# Patient Record
Sex: FEMALE
[De-identification: names, ages, dates, MRNs, and addresses within clinical notes are randomized per-mention and may not be internally consistent; named-entity substitution may affect disease eponyms.]

---

## 2018-05-14 VITALS — DIASTOLIC BLOOD PRESSURE: 71 MMHG | SYSTOLIC BLOOD PRESSURE: 128 MMHG

## 2018-05-17 ENCOUNTER — HOSPITAL ENCOUNTER (OUTPATIENT)
Dept: HOSPITAL 90 - DAH | Age: 68
Discharge: HOME | End: 2018-05-17
Attending: NEUROLOGICAL SURGERY
Payer: MEDICARE

## 2018-05-17 VITALS — SYSTOLIC BLOOD PRESSURE: 153 MMHG | DIASTOLIC BLOOD PRESSURE: 72 MMHG

## 2018-05-17 VITALS — SYSTOLIC BLOOD PRESSURE: 133 MMHG | DIASTOLIC BLOOD PRESSURE: 69 MMHG

## 2018-05-17 VITALS — DIASTOLIC BLOOD PRESSURE: 63 MMHG | SYSTOLIC BLOOD PRESSURE: 128 MMHG

## 2018-05-17 VITALS — BODY MASS INDEX: 25.82 KG/M2 | HEIGHT: 58 IN | WEIGHT: 123 LBS

## 2018-05-17 VITALS — SYSTOLIC BLOOD PRESSURE: 116 MMHG | DIASTOLIC BLOOD PRESSURE: 65 MMHG

## 2018-05-17 VITALS — SYSTOLIC BLOOD PRESSURE: 119 MMHG | DIASTOLIC BLOOD PRESSURE: 64 MMHG

## 2018-05-17 VITALS — DIASTOLIC BLOOD PRESSURE: 66 MMHG | SYSTOLIC BLOOD PRESSURE: 125 MMHG

## 2018-05-17 VITALS — DIASTOLIC BLOOD PRESSURE: 65 MMHG | SYSTOLIC BLOOD PRESSURE: 128 MMHG

## 2018-05-17 VITALS — DIASTOLIC BLOOD PRESSURE: 61 MMHG | SYSTOLIC BLOOD PRESSURE: 118 MMHG

## 2018-05-17 VITALS — SYSTOLIC BLOOD PRESSURE: 125 MMHG | DIASTOLIC BLOOD PRESSURE: 69 MMHG

## 2018-05-17 VITALS — SYSTOLIC BLOOD PRESSURE: 129 MMHG | DIASTOLIC BLOOD PRESSURE: 64 MMHG

## 2018-05-17 VITALS — DIASTOLIC BLOOD PRESSURE: 59 MMHG | SYSTOLIC BLOOD PRESSURE: 122 MMHG

## 2018-05-17 VITALS — SYSTOLIC BLOOD PRESSURE: 123 MMHG | DIASTOLIC BLOOD PRESSURE: 62 MMHG

## 2018-05-17 VITALS — DIASTOLIC BLOOD PRESSURE: 64 MMHG | SYSTOLIC BLOOD PRESSURE: 117 MMHG

## 2018-05-17 DIAGNOSIS — I99.8: ICD-10-CM

## 2018-05-17 DIAGNOSIS — E11.9: ICD-10-CM

## 2018-05-17 DIAGNOSIS — G56.02: Primary | ICD-10-CM

## 2018-05-17 DIAGNOSIS — M19.90: ICD-10-CM

## 2018-05-17 DIAGNOSIS — Z79.84: ICD-10-CM

## 2018-05-17 DIAGNOSIS — Z79.899: ICD-10-CM

## 2018-05-17 DIAGNOSIS — I10: ICD-10-CM

## 2018-05-17 DIAGNOSIS — I48.91: ICD-10-CM

## 2018-05-17 PROCEDURE — 64721 CARPAL TUNNEL SURGERY: CPT

## 2018-05-17 PROCEDURE — 82948 REAGENT STRIP/BLOOD GLUCOSE: CPT

## 2018-05-17 PROCEDURE — 93005 ELECTROCARDIOGRAM TRACING: CPT

## 2018-05-17 RX ADMIN — SODIUM CHLORIDE SCH GM: 9 INJECTION, SOLUTION INTRAVENOUS at 07:30

## 2018-05-17 RX ADMIN — SODIUM CHLORIDE SCH GM: 9 INJECTION, SOLUTION INTRAVENOUS at 05:00

## 2020-12-03 ENCOUNTER — HOSPITAL ENCOUNTER (OUTPATIENT)
Dept: HOSPITAL 90 - RAH | Age: 70
Discharge: HOME | End: 2020-12-03
Attending: NEUROLOGICAL SURGERY
Payer: COMMERCIAL

## 2020-12-03 DIAGNOSIS — M47.812: Primary | ICD-10-CM

## 2020-12-03 DIAGNOSIS — M85.88: ICD-10-CM

## 2020-12-03 DIAGNOSIS — M43.13: ICD-10-CM

## 2020-12-03 DIAGNOSIS — M43.22: ICD-10-CM

## 2020-12-03 DIAGNOSIS — M48.02: ICD-10-CM

## 2020-12-03 LAB
BASOPHILS NFR BLD AUTO: 0.6 % (ref 0–5)
BUN SERPL-MCNC: 23 MG/DL (ref 7–18)
CHLORIDE SERPL-SCNC: 104 MMOL/L (ref 101–111)
CO2 SERPL-SCNC: 29 MMOL/L (ref 21–32)
CREAT SERPL-MCNC: 0.9 MG/DL (ref 0.5–1.5)
EOSINOPHIL NFR BLD AUTO: 1 % (ref 0–8)
ERYTHROCYTE [DISTWIDTH] IN BLOOD BY AUTOMATED COUNT: 13.3 % (ref 11–15.5)
GFR SERPL CREATININE-BSD FRML MDRD: 66 ML/MIN (ref 60–?)
GLUCOSE SERPL-MCNC: 72 MG/DL (ref 70–105)
HCT VFR BLD AUTO: 34 % (ref 36–48)
LYMPHOCYTES NFR SPEC AUTO: 29.8 % (ref 21–51)
MCH RBC QN AUTO: 27.6 PG (ref 27–33)
MCHC RBC AUTO-ENTMCNC: 31.5 G/DL (ref 32–36)
MCV RBC AUTO: 87.9 FL (ref 79–99)
MONOCYTES NFR BLD AUTO: 7.6 % (ref 3–13)
NEUTROPHILS NFR BLD AUTO: 60.8 % (ref 40–77)
NRBC BLD MANUAL-RTO: 0 % (ref 0–0.19)
PLATELET # BLD AUTO: 349 K/UL (ref 130–400)
POTASSIUM SERPL-SCNC: 4.3 MMOL/L (ref 3.5–5.1)
RBC # BLD AUTO: 3.87 MIL/UL (ref 4–5.5)
SODIUM SERPL-SCNC: 142 MMOL/L (ref 136–145)
WBC # BLD AUTO: 8.7 K/UL (ref 4.8–10.8)

## 2020-12-03 PROCEDURE — 72050 X-RAY EXAM NECK SPINE 4/5VWS: CPT

## 2020-12-08 VITALS — SYSTOLIC BLOOD PRESSURE: 117 MMHG | DIASTOLIC BLOOD PRESSURE: 60 MMHG

## 2020-12-08 LAB
DEPRECATED SQUAMOUS URNS QL MICRO: (no result) /HPF (ref 0–2)
PH UR STRIP: 5.5 [PH] (ref 5–8)
RBC #/AREA URNS HPF: (no result) /HPF (ref 0–1)
SP GR UR STRIP: 1.01 (ref 1–1.03)
UROBILINOGEN UR STRIP-MCNC: 0.2 MG/DL (ref 0.2–1)

## 2020-12-08 RX ADMIN — SODIUM CHLORIDE SCH GM: 9 INJECTION, SOLUTION INTRAVENOUS at 06:00

## 2020-12-08 NOTE — NUR
UA

PT STATES HAS A TEMP .3. DR. GARDINER INFORMED. UA AND PAID COVID ORDERED. PT HAS UTI AND 
NEGATIVE RAPID RESULT. INFORMED DR. ZULEYKA GATICA ASST. AS PER DR. GARDINER, HE WILL PROCEED WITH 
SURGERY AND CALL IN A PRESCRIPTION FOR HER UTI TO START TODAY. PT INFORMED. VERBALIZED 
UNDERSTANDING, AND WIAHES TO PROCEED WITH SURGERY TOMORROW.

## 2020-12-09 ENCOUNTER — HOSPITAL ENCOUNTER (OUTPATIENT)
Dept: HOSPITAL 90 - DAH | Age: 70
Discharge: HOME | End: 2020-12-09
Attending: NEUROLOGICAL SURGERY
Payer: COMMERCIAL

## 2020-12-09 VITALS — DIASTOLIC BLOOD PRESSURE: 58 MMHG | SYSTOLIC BLOOD PRESSURE: 132 MMHG

## 2020-12-09 VITALS — SYSTOLIC BLOOD PRESSURE: 112 MMHG | DIASTOLIC BLOOD PRESSURE: 47 MMHG

## 2020-12-09 VITALS — SYSTOLIC BLOOD PRESSURE: 136 MMHG | DIASTOLIC BLOOD PRESSURE: 54 MMHG

## 2020-12-09 VITALS — DIASTOLIC BLOOD PRESSURE: 54 MMHG | SYSTOLIC BLOOD PRESSURE: 113 MMHG

## 2020-12-09 VITALS — SYSTOLIC BLOOD PRESSURE: 116 MMHG | DIASTOLIC BLOOD PRESSURE: 48 MMHG

## 2020-12-09 VITALS — DIASTOLIC BLOOD PRESSURE: 56 MMHG | SYSTOLIC BLOOD PRESSURE: 118 MMHG

## 2020-12-09 VITALS — SYSTOLIC BLOOD PRESSURE: 117 MMHG | DIASTOLIC BLOOD PRESSURE: 51 MMHG

## 2020-12-09 VITALS — DIASTOLIC BLOOD PRESSURE: 51 MMHG | SYSTOLIC BLOOD PRESSURE: 107 MMHG

## 2020-12-09 VITALS — HEIGHT: 58 IN | BODY MASS INDEX: 25.82 KG/M2 | WEIGHT: 123 LBS

## 2020-12-09 VITALS — SYSTOLIC BLOOD PRESSURE: 112 MMHG | DIASTOLIC BLOOD PRESSURE: 50 MMHG

## 2020-12-09 VITALS — DIASTOLIC BLOOD PRESSURE: 60 MMHG | SYSTOLIC BLOOD PRESSURE: 126 MMHG

## 2020-12-09 VITALS — SYSTOLIC BLOOD PRESSURE: 124 MMHG | DIASTOLIC BLOOD PRESSURE: 66 MMHG

## 2020-12-09 DIAGNOSIS — Z98.1: ICD-10-CM

## 2020-12-09 DIAGNOSIS — Z91.040: ICD-10-CM

## 2020-12-09 DIAGNOSIS — G56.01: Primary | ICD-10-CM

## 2020-12-09 DIAGNOSIS — E11.9: ICD-10-CM

## 2020-12-09 DIAGNOSIS — Z79.82: ICD-10-CM

## 2020-12-09 DIAGNOSIS — Z79.899: ICD-10-CM

## 2020-12-09 DIAGNOSIS — I10: ICD-10-CM

## 2020-12-09 DIAGNOSIS — Z79.84: ICD-10-CM

## 2020-12-09 DIAGNOSIS — Z98.890: ICD-10-CM

## 2020-12-09 DIAGNOSIS — Z20.828: ICD-10-CM

## 2020-12-09 PROCEDURE — 93005 ELECTROCARDIOGRAM TRACING: CPT

## 2020-12-09 PROCEDURE — 87186 SC STD MICRODIL/AGAR DIL: CPT

## 2020-12-09 PROCEDURE — 81001 URINALYSIS AUTO W/SCOPE: CPT

## 2020-12-09 PROCEDURE — 87426 SARSCOV CORONAVIRUS AG IA: CPT

## 2020-12-09 PROCEDURE — 82948 REAGENT STRIP/BLOOD GLUCOSE: CPT

## 2020-12-09 PROCEDURE — 36415 COLL VENOUS BLD VENIPUNCTURE: CPT

## 2020-12-09 PROCEDURE — 87088 URINE BACTERIA CULTURE: CPT

## 2020-12-09 PROCEDURE — 80048 BASIC METABOLIC PNL TOTAL CA: CPT

## 2020-12-09 PROCEDURE — 64721 CARPAL TUNNEL SURGERY: CPT

## 2020-12-09 PROCEDURE — 87077 CULTURE AEROBIC IDENTIFY: CPT

## 2020-12-09 PROCEDURE — 85025 COMPLETE CBC W/AUTO DIFF WBC: CPT

## 2020-12-09 RX ADMIN — SODIUM CHLORIDE SCH GM: 9 INJECTION, SOLUTION INTRAVENOUS at 11:25

## 2020-12-09 NOTE — NUR
Pt received



Pt received via stretcher accompanied by ASHLEY Jimenez.  Pt awake and alert.  Has dressing to 
right hand; stated no need to loosen it.  Good capillary refill to right fingers; warm to 
touch.  Denies any pain at this time.  Fluid offered.

## 2020-12-09 NOTE — NUR
D/C



Pt prepared for discharge; verbal instructions given to daughter over the phone (Shirley Rodas) and pt.  Written instructions given to pt with a rx for Toradol.  Instructed pt and 
daughter on dosage and frequency.  Verbalized understanding.  Assisted pt to dress and she 
was taken to private vehicle via w/c in no distress.  Dressing remained dry and intact.  
Good capillary refill to fingers, warm to touch.